# Patient Record
Sex: FEMALE
[De-identification: names, ages, dates, MRNs, and addresses within clinical notes are randomized per-mention and may not be internally consistent; named-entity substitution may affect disease eponyms.]

---

## 2022-07-15 PROBLEM — Z00.00 ENCOUNTER FOR PREVENTIVE HEALTH EXAMINATION: Status: ACTIVE | Noted: 2022-07-15

## 2022-07-29 ENCOUNTER — APPOINTMENT (OUTPATIENT)
Dept: UROLOGY | Facility: CLINIC | Age: 52
End: 2022-07-29

## 2022-07-29 VITALS
WEIGHT: 135 LBS | RESPIRATION RATE: 17 BRPM | DIASTOLIC BLOOD PRESSURE: 82 MMHG | TEMPERATURE: 97.6 F | HEIGHT: 62 IN | HEART RATE: 69 BPM | BODY MASS INDEX: 24.84 KG/M2 | SYSTOLIC BLOOD PRESSURE: 137 MMHG

## 2022-07-29 PROCEDURE — 99203 OFFICE O/P NEW LOW 30 MIN: CPT

## 2022-07-29 NOTE — ASSESSMENT
[FreeTextEntry1] : 51F with left UPJ obstruction\par \par - CT urogram to delineate anatomy\par - Discussed lap pyeloplasty\par - Patient to consider options and call back for surgical scheduling\par - Urine culture today

## 2022-07-29 NOTE — HISTORY OF PRESENT ILLNESS
[None] : no symptoms [FreeTextEntry1] : 51F with history of Left UPJ obstruction\par Dr Schumer (urologist) referred for pyeloplasty.\par Initially presented to ED in 2/2020 with dysuria and bilateral flank pain, patient followed up with PMD who referred to Urologist.\par Unclear that she ever had a CT scan but sounds like hydro on US led to urology referral.\par NM renogrphay in 2020 -> 41% L relative function with delayed uptake and excretion\par US 2021 -> "prominent left hydronephrosis.\par NM renography 2021 -> 44% function\par US 2021 -> severe left hydro, unchanged, normal parenchymal thickness\par MN renography 2022 -> delayed uptake and excretion, 36% relative function.\par \par Feeling well currently, no real pain, nausea/emesis. \par No hematuria, multiple UTIs over the past 6 months\par No UTI symptoms currently\par \par PMH: onychomycosis\par PSH: c section, candace\par FH: DM \par SH: social drinker, nonsmoker\par Meds: terbinafine

## 2022-08-01 LAB — BACTERIA UR CULT: NORMAL

## 2022-09-20 ENCOUNTER — APPOINTMENT (OUTPATIENT)
Dept: CT IMAGING | Facility: CLINIC | Age: 52
End: 2022-09-20

## 2022-09-20 ENCOUNTER — OUTPATIENT (OUTPATIENT)
Dept: OUTPATIENT SERVICES | Facility: HOSPITAL | Age: 52
LOS: 1 days | End: 2022-09-20
Payer: COMMERCIAL

## 2022-09-20 DIAGNOSIS — N13.5 CROSSING VESSEL AND STRICTURE OF URETER WITHOUT HYDRONEPHROSIS: ICD-10-CM

## 2022-09-20 PROCEDURE — 74178 CT ABD&PLV WO CNTR FLWD CNTR: CPT

## 2022-09-20 PROCEDURE — 74178 CT ABD&PLV WO CNTR FLWD CNTR: CPT | Mod: 26

## 2022-10-25 ENCOUNTER — OUTPATIENT (OUTPATIENT)
Dept: OUTPATIENT SERVICES | Facility: HOSPITAL | Age: 52
LOS: 1 days | End: 2022-10-25

## 2022-10-25 VITALS
OXYGEN SATURATION: 98 % | TEMPERATURE: 97 F | HEART RATE: 65 BPM | WEIGHT: 134.92 LBS | RESPIRATION RATE: 16 BRPM | HEIGHT: 63 IN | DIASTOLIC BLOOD PRESSURE: 80 MMHG | SYSTOLIC BLOOD PRESSURE: 130 MMHG

## 2022-10-25 DIAGNOSIS — Z90.49 ACQUIRED ABSENCE OF OTHER SPECIFIED PARTS OF DIGESTIVE TRACT: Chronic | ICD-10-CM

## 2022-10-25 DIAGNOSIS — N13.5 CROSSING VESSEL AND STRICTURE OF URETER WITHOUT HYDRONEPHROSIS: ICD-10-CM

## 2022-10-25 DIAGNOSIS — Z98.891 HISTORY OF UTERINE SCAR FROM PREVIOUS SURGERY: Chronic | ICD-10-CM

## 2022-10-25 LAB
ANION GAP SERPL CALC-SCNC: 8 MMOL/L — SIGNIFICANT CHANGE UP (ref 7–14)
BLD GP AB SCN SERPL QL: NEGATIVE — SIGNIFICANT CHANGE UP
BUN SERPL-MCNC: 12 MG/DL — SIGNIFICANT CHANGE UP (ref 7–23)
CALCIUM SERPL-MCNC: 9.2 MG/DL — SIGNIFICANT CHANGE UP (ref 8.4–10.5)
CHLORIDE SERPL-SCNC: 107 MMOL/L — SIGNIFICANT CHANGE UP (ref 98–107)
CO2 SERPL-SCNC: 29 MMOL/L — SIGNIFICANT CHANGE UP (ref 22–31)
CREAT SERPL-MCNC: 0.78 MG/DL — SIGNIFICANT CHANGE UP (ref 0.5–1.3)
EGFR: 91 ML/MIN/1.73M2 — SIGNIFICANT CHANGE UP
GLUCOSE SERPL-MCNC: 79 MG/DL — SIGNIFICANT CHANGE UP (ref 70–99)
HCG UR QL: NEGATIVE — SIGNIFICANT CHANGE UP
HCT VFR BLD CALC: 37.7 % — SIGNIFICANT CHANGE UP (ref 34.5–45)
HGB BLD-MCNC: 11.7 G/DL — SIGNIFICANT CHANGE UP (ref 11.5–15.5)
MCHC RBC-ENTMCNC: 25.5 PG — LOW (ref 27–34)
MCHC RBC-ENTMCNC: 31 GM/DL — LOW (ref 32–36)
MCV RBC AUTO: 82.1 FL — SIGNIFICANT CHANGE UP (ref 80–100)
NRBC # BLD: 0 /100 WBCS — SIGNIFICANT CHANGE UP (ref 0–0)
NRBC # FLD: 0 K/UL — SIGNIFICANT CHANGE UP (ref 0–0)
PLATELET # BLD AUTO: 274 K/UL — SIGNIFICANT CHANGE UP (ref 150–400)
POTASSIUM SERPL-MCNC: 3.8 MMOL/L — SIGNIFICANT CHANGE UP (ref 3.5–5.3)
POTASSIUM SERPL-SCNC: 3.8 MMOL/L — SIGNIFICANT CHANGE UP (ref 3.5–5.3)
RBC # BLD: 4.59 M/UL — SIGNIFICANT CHANGE UP (ref 3.8–5.2)
RBC # FLD: 15.6 % — HIGH (ref 10.3–14.5)
RH IG SCN BLD-IMP: POSITIVE — SIGNIFICANT CHANGE UP
SODIUM SERPL-SCNC: 144 MMOL/L — SIGNIFICANT CHANGE UP (ref 135–145)
WBC # BLD: 6.14 K/UL — SIGNIFICANT CHANGE UP (ref 3.8–10.5)
WBC # FLD AUTO: 6.14 K/UL — SIGNIFICANT CHANGE UP (ref 3.8–10.5)

## 2022-10-25 NOTE — H&P PST ADULT - GENITOURINARY COMMENTS
Past hx ureteral stricture with worsening kidney function - pt now for surgery to correct - pt denies flank pain or hematuria at present not examined

## 2022-10-25 NOTE — H&P PST ADULT - HISTORY OF PRESENT ILLNESS
Pt is a 52 yr old female scheduled for Left Laparoscopic Pyeloplasty with Dr Garcia tentatively 11/9/22 - pt with noted ureteral blockage 3 yrs ago - f/u with Urologist now notes decreased kidney function - pt hx frequent UTI but denies recent symptoms or tx.   Pt given information for COVID PCR testing sites and told to make appointment 3-5 days preop

## 2022-10-26 LAB
CULTURE RESULTS: SIGNIFICANT CHANGE UP
SPECIMEN SOURCE: SIGNIFICANT CHANGE UP

## 2022-11-05 LAB — SARS-COV-2 RNA SPEC QL NAA+PROBE: SIGNIFICANT CHANGE UP

## 2022-11-08 ENCOUNTER — TRANSCRIPTION ENCOUNTER (OUTPATIENT)
Age: 52
End: 2022-11-08

## 2022-11-08 NOTE — ASU PATIENT PROFILE, ADULT - FALL HARM RISK - UNIVERSAL INTERVENTIONS
Bed in lowest position, wheels locked, appropriate side rails in place/Call bell, personal items and telephone in reach/Instruct patient to call for assistance before getting out of bed or chair/Non-slip footwear when patient is out of bed/[place] to call system/Physically safe environment - no spills, clutter or unnecessary equipment/Purposeful Proactive Rounding/Room/bathroom lighting operational, light cord in reach

## 2022-11-09 ENCOUNTER — INPATIENT (INPATIENT)
Facility: HOSPITAL | Age: 52
LOS: 0 days | Discharge: HOME | End: 2022-11-10
Attending: UROLOGY | Admitting: UROLOGY

## 2022-11-09 ENCOUNTER — APPOINTMENT (OUTPATIENT)
Dept: UROLOGY | Facility: HOSPITAL | Age: 52
End: 2022-11-09

## 2022-11-09 VITALS
RESPIRATION RATE: 16 BRPM | WEIGHT: 134.92 LBS | DIASTOLIC BLOOD PRESSURE: 64 MMHG | HEIGHT: 63 IN | HEART RATE: 56 BPM | OXYGEN SATURATION: 100 % | SYSTOLIC BLOOD PRESSURE: 139 MMHG | TEMPERATURE: 98 F

## 2022-11-09 DIAGNOSIS — Z98.891 HISTORY OF UTERINE SCAR FROM PREVIOUS SURGERY: Chronic | ICD-10-CM

## 2022-11-09 DIAGNOSIS — N13.5 CROSSING VESSEL AND STRICTURE OF URETER WITHOUT HYDRONEPHROSIS: ICD-10-CM

## 2022-11-09 DIAGNOSIS — Z90.49 ACQUIRED ABSENCE OF OTHER SPECIFIED PARTS OF DIGESTIVE TRACT: Chronic | ICD-10-CM

## 2022-11-09 PROBLEM — Z87.448 PERSONAL HISTORY OF OTHER DISEASES OF URINARY SYSTEM: Chronic | Status: ACTIVE | Noted: 2022-10-25

## 2022-11-09 LAB — HCG UR QL: NEGATIVE — SIGNIFICANT CHANGE UP

## 2022-11-09 DEVICE — LIGATING CLIPS WECK HEMOLOK POLYMER LARGE (PURPLE) 6: Type: IMPLANTABLE DEVICE | Status: FUNCTIONAL

## 2022-11-09 DEVICE — URETERAL CATH AXXCESS OPEN END 6FR 70CM: Type: IMPLANTABLE DEVICE | Status: FUNCTIONAL

## 2022-11-09 DEVICE — STENT URET 7FRX28: Type: IMPLANTABLE DEVICE | Status: FUNCTIONAL

## 2022-11-09 DEVICE — CLIP APPLIER COVIDIEN ENDOCLIP III 5MM: Type: IMPLANTABLE DEVICE | Status: FUNCTIONAL

## 2022-11-09 DEVICE — GUIDEWIRE SENSOR DUAL-FLEX NITINOL STRAIGHT .035" X 150CM: Type: IMPLANTABLE DEVICE | Status: FUNCTIONAL

## 2022-11-09 RX ORDER — ONDANSETRON 8 MG/1
4 TABLET, FILM COATED ORAL EVERY 6 HOURS
Refills: 0 | Status: DISCONTINUED | OUTPATIENT
Start: 2022-11-09 | End: 2022-11-10

## 2022-11-09 RX ORDER — KETOROLAC TROMETHAMINE 30 MG/ML
15 SYRINGE (ML) INJECTION ONCE
Refills: 0 | Status: DISCONTINUED | OUTPATIENT
Start: 2022-11-09 | End: 2022-11-09

## 2022-11-09 RX ORDER — OXYCODONE HYDROCHLORIDE 5 MG/1
5 TABLET ORAL EVERY 4 HOURS
Refills: 0 | Status: DISCONTINUED | OUTPATIENT
Start: 2022-11-09 | End: 2022-11-10

## 2022-11-09 RX ORDER — ONDANSETRON 8 MG/1
4 TABLET, FILM COATED ORAL ONCE
Refills: 0 | Status: DISCONTINUED | OUTPATIENT
Start: 2022-11-09 | End: 2022-11-09

## 2022-11-09 RX ORDER — ACETAMINOPHEN 500 MG
975 TABLET ORAL EVERY 6 HOURS
Refills: 0 | Status: DISCONTINUED | OUTPATIENT
Start: 2022-11-09 | End: 2022-11-10

## 2022-11-09 RX ORDER — METOCLOPRAMIDE HCL 10 MG
10 TABLET ORAL ONCE
Refills: 0 | Status: COMPLETED | OUTPATIENT
Start: 2022-11-09 | End: 2022-11-10

## 2022-11-09 RX ORDER — SODIUM CHLORIDE 9 MG/ML
1000 INJECTION, SOLUTION INTRAVENOUS
Refills: 0 | Status: DISCONTINUED | OUTPATIENT
Start: 2022-11-09 | End: 2022-11-10

## 2022-11-09 RX ORDER — HYDROMORPHONE HYDROCHLORIDE 2 MG/ML
0.5 INJECTION INTRAMUSCULAR; INTRAVENOUS; SUBCUTANEOUS
Refills: 0 | Status: DISCONTINUED | OUTPATIENT
Start: 2022-11-09 | End: 2022-11-09

## 2022-11-09 RX ORDER — OXYCODONE HYDROCHLORIDE 5 MG/1
10 TABLET ORAL EVERY 4 HOURS
Refills: 0 | Status: DISCONTINUED | OUTPATIENT
Start: 2022-11-09 | End: 2022-11-10

## 2022-11-09 RX ORDER — HEPARIN SODIUM 5000 [USP'U]/ML
5000 INJECTION INTRAVENOUS; SUBCUTANEOUS EVERY 8 HOURS
Refills: 0 | Status: DISCONTINUED | OUTPATIENT
Start: 2022-11-09 | End: 2022-11-10

## 2022-11-09 RX ADMIN — OXYCODONE HYDROCHLORIDE 5 MILLIGRAM(S): 5 TABLET ORAL at 22:05

## 2022-11-09 RX ADMIN — SODIUM CHLORIDE 125 MILLILITER(S): 9 INJECTION, SOLUTION INTRAVENOUS at 21:57

## 2022-11-09 RX ADMIN — HYDROMORPHONE HYDROCHLORIDE 0.5 MILLIGRAM(S): 2 INJECTION INTRAMUSCULAR; INTRAVENOUS; SUBCUTANEOUS at 18:35

## 2022-11-09 RX ADMIN — HYDROMORPHONE HYDROCHLORIDE 0.5 MILLIGRAM(S): 2 INJECTION INTRAMUSCULAR; INTRAVENOUS; SUBCUTANEOUS at 18:05

## 2022-11-09 RX ADMIN — ONDANSETRON 4 MILLIGRAM(S): 8 TABLET, FILM COATED ORAL at 21:57

## 2022-11-09 RX ADMIN — HYDROMORPHONE HYDROCHLORIDE 0.5 MILLIGRAM(S): 2 INJECTION INTRAMUSCULAR; INTRAVENOUS; SUBCUTANEOUS at 17:35

## 2022-11-09 RX ADMIN — HEPARIN SODIUM 5000 UNIT(S): 5000 INJECTION INTRAVENOUS; SUBCUTANEOUS at 21:09

## 2022-11-09 RX ADMIN — Medication 15 MILLIGRAM(S): at 23:06

## 2022-11-09 RX ADMIN — OXYCODONE HYDROCHLORIDE 10 MILLIGRAM(S): 5 TABLET ORAL at 21:56

## 2022-11-09 RX ADMIN — OXYCODONE HYDROCHLORIDE 5 MILLIGRAM(S): 5 TABLET ORAL at 23:00

## 2022-11-09 RX ADMIN — Medication 975 MILLIGRAM(S): at 18:00

## 2022-11-09 RX ADMIN — Medication 975 MILLIGRAM(S): at 18:30

## 2022-11-09 RX ADMIN — Medication 15 MILLIGRAM(S): at 23:40

## 2022-11-09 NOTE — ASU PREOP CHECKLIST - NS PREOP CHK INSULIN PUMP THERAPY
Pt requesting Hydrocodone  mg      LOV 02/07/2019  Appt 05/09/2019  Last refilled 03/13/2019 # 90 with 0    Routed to MD for approval   n/a

## 2022-11-09 NOTE — CHART NOTE - NSCHARTNOTEFT_GEN_A_CORE
Post op Check    Pt seen and examined without complaints. Pain is controlled. Denies SOB/CP/N/V.     Vital Signs Last 24 Hrs  T(C): 36.7 (09 Nov 2022 20:30), Max: 37.1 (09 Nov 2022 19:00)  T(F): 98.1 (09 Nov 2022 20:30), Max: 98.8 (09 Nov 2022 19:00)  HR: 89 (09 Nov 2022 20:30) (56 - 96)  BP: 135/65 (09 Nov 2022 20:30) (105/74 - 214/190)  BP(mean): 81 (09 Nov 2022 19:00) (70 - 196)  RR: 18 (09 Nov 2022 20:30) (12 - 18)  SpO2: 98% (09 Nov 2022 20:30) (93% - 100%)    Parameters below as of 09 Nov 2022 20:30  Patient On (Oxygen Delivery Method): room air    I&O's Summary    09 Nov 2022 07:01  -  09 Nov 2022 20:46  --------------------------------------------------------  IN: 0 mL / OUT: 1025 mL / NET: -1025 mL    Physical Exam  Gen: NAD, A&Ox3  Pulm: No respiratory distress, no subcostal retractions  CV: RRR, no JVD  Abd: Soft, NT, ND  : salas in place, draining clear blood tinged urine    A/P: 52y Female s/p left laparoscopic pyeloplasty and stent placement   - Bactrim   - DVT prophylaxis/OOB  - Incentive spirometry  - Strict I&O's  - Analgesia as needed  - CLD  - AM labs  - DERRICK creatinine  - TOV in AM

## 2022-11-09 NOTE — BRIEF OPERATIVE NOTE - NSICDXBRIEFPROCEDURE_GEN_ALL_CORE_FT
PROCEDURES:  Laparoscopic left pyeloplasty 09-Nov-2022 16:12:24 and flexible cystoscopy with stent placement Mayo Ferrara

## 2022-11-10 ENCOUNTER — TRANSCRIPTION ENCOUNTER (OUTPATIENT)
Age: 52
End: 2022-11-10

## 2022-11-10 VITALS
HEART RATE: 88 BPM | SYSTOLIC BLOOD PRESSURE: 135 MMHG | RESPIRATION RATE: 18 BRPM | DIASTOLIC BLOOD PRESSURE: 78 MMHG | OXYGEN SATURATION: 99 % | TEMPERATURE: 98 F

## 2022-11-10 LAB
ANION GAP SERPL CALC-SCNC: 11 MMOL/L — SIGNIFICANT CHANGE UP (ref 7–14)
BUN SERPL-MCNC: 12 MG/DL — SIGNIFICANT CHANGE UP (ref 7–23)
CALCIUM SERPL-MCNC: 9.4 MG/DL — SIGNIFICANT CHANGE UP (ref 8.4–10.5)
CHLORIDE SERPL-SCNC: 100 MMOL/L — SIGNIFICANT CHANGE UP (ref 98–107)
CO2 SERPL-SCNC: 26 MMOL/L — SIGNIFICANT CHANGE UP (ref 22–31)
CREAT FLD-MCNC: 0.76 MG/DL — SIGNIFICANT CHANGE UP
CREAT SERPL-MCNC: 0.7 MG/DL — SIGNIFICANT CHANGE UP (ref 0.5–1.3)
EGFR: 103 ML/MIN/1.73M2 — SIGNIFICANT CHANGE UP
GLUCOSE SERPL-MCNC: 119 MG/DL — HIGH (ref 70–99)
HCT VFR BLD CALC: 33.6 % — LOW (ref 34.5–45)
HGB BLD-MCNC: 10.8 G/DL — LOW (ref 11.5–15.5)
MCHC RBC-ENTMCNC: 25.7 PG — LOW (ref 27–34)
MCHC RBC-ENTMCNC: 32.1 GM/DL — SIGNIFICANT CHANGE UP (ref 32–36)
MCV RBC AUTO: 80 FL — SIGNIFICANT CHANGE UP (ref 80–100)
NRBC # BLD: 0 /100 WBCS — SIGNIFICANT CHANGE UP (ref 0–0)
NRBC # FLD: 0 K/UL — SIGNIFICANT CHANGE UP (ref 0–0)
PLATELET # BLD AUTO: 305 K/UL — SIGNIFICANT CHANGE UP (ref 150–400)
POTASSIUM SERPL-MCNC: 4.1 MMOL/L — SIGNIFICANT CHANGE UP (ref 3.5–5.3)
POTASSIUM SERPL-SCNC: 4.1 MMOL/L — SIGNIFICANT CHANGE UP (ref 3.5–5.3)
RBC # BLD: 4.2 M/UL — SIGNIFICANT CHANGE UP (ref 3.8–5.2)
RBC # FLD: 15.1 % — HIGH (ref 10.3–14.5)
SODIUM SERPL-SCNC: 137 MMOL/L — SIGNIFICANT CHANGE UP (ref 135–145)
WBC # BLD: 13.61 K/UL — HIGH (ref 3.8–10.5)
WBC # FLD AUTO: 13.61 K/UL — HIGH (ref 3.8–10.5)

## 2022-11-10 PROCEDURE — 99222 1ST HOSP IP/OBS MODERATE 55: CPT

## 2022-11-10 RX ORDER — INFLUENZA VIRUS VACCINE 15; 15; 15; 15 UG/.5ML; UG/.5ML; UG/.5ML; UG/.5ML
0.5 SUSPENSION INTRAMUSCULAR ONCE
Refills: 0 | Status: DISCONTINUED | OUTPATIENT
Start: 2022-11-10 | End: 2022-11-10

## 2022-11-10 RX ORDER — SODIUM CHLORIDE 9 MG/ML
1000 INJECTION, SOLUTION INTRAVENOUS
Refills: 0 | Status: DISCONTINUED | OUTPATIENT
Start: 2022-11-10 | End: 2022-11-10

## 2022-11-10 RX ORDER — ACETAMINOPHEN 500 MG
1000 TABLET ORAL ONCE
Refills: 0 | Status: COMPLETED | OUTPATIENT
Start: 2022-11-10 | End: 2022-11-10

## 2022-11-10 RX ORDER — TAMSULOSIN HYDROCHLORIDE 0.4 MG/1
0.4 CAPSULE ORAL ONCE
Refills: 0 | Status: COMPLETED | OUTPATIENT
Start: 2022-11-10 | End: 2022-11-10

## 2022-11-10 RX ORDER — TAMSULOSIN HYDROCHLORIDE 0.4 MG/1
1 CAPSULE ORAL
Qty: 30 | Refills: 0
Start: 2022-11-10 | End: 2022-12-09

## 2022-11-10 RX ORDER — TRIMETHOPRIM HYDROCHLORIDE 50 MG/5ML
1 SOLUTION ORAL
Qty: 42 | Refills: 0
Start: 2022-11-10 | End: 2022-12-21

## 2022-11-10 RX ORDER — ACETAMINOPHEN 500 MG
2 TABLET ORAL
Qty: 0 | Refills: 0 | DISCHARGE

## 2022-11-10 RX ORDER — PHENAZOPYRIDINE HCL 100 MG
200 TABLET ORAL ONCE
Refills: 0 | Status: COMPLETED | OUTPATIENT
Start: 2022-11-10 | End: 2022-11-10

## 2022-11-10 RX ORDER — POLYETHYLENE GLYCOL 3350 17 G/17G
1 POWDER, FOR SOLUTION ORAL
Qty: 0 | Refills: 0 | DISCHARGE

## 2022-11-10 RX ORDER — OXYCODONE HYDROCHLORIDE 5 MG/1
1 TABLET ORAL
Qty: 8 | Refills: 0
Start: 2022-11-10

## 2022-11-10 RX ORDER — PHENAZOPYRIDINE HCL 100 MG
1 TABLET ORAL
Qty: 6 | Refills: 0
Start: 2022-11-10 | End: 2022-11-11

## 2022-11-10 RX ORDER — ACETAMINOPHEN 500 MG
1000 TABLET ORAL ONCE
Refills: 0 | Status: DISCONTINUED | OUTPATIENT
Start: 2022-11-10 | End: 2022-11-10

## 2022-11-10 RX ADMIN — Medication 200 MILLIGRAM(S): at 11:26

## 2022-11-10 RX ADMIN — Medication 1000 MILLIGRAM(S): at 00:11

## 2022-11-10 RX ADMIN — Medication 400 MILLIGRAM(S): at 12:47

## 2022-11-10 RX ADMIN — Medication 10 MILLIGRAM(S): at 00:06

## 2022-11-10 RX ADMIN — HEPARIN SODIUM 5000 UNIT(S): 5000 INJECTION INTRAVENOUS; SUBCUTANEOUS at 05:08

## 2022-11-10 RX ADMIN — Medication 1 TABLET(S): at 12:47

## 2022-11-10 RX ADMIN — Medication 10 MILLIGRAM(S): at 05:10

## 2022-11-10 RX ADMIN — SODIUM CHLORIDE 75 MILLILITER(S): 9 INJECTION, SOLUTION INTRAVENOUS at 07:54

## 2022-11-10 RX ADMIN — Medication 400 MILLIGRAM(S): at 06:01

## 2022-11-10 RX ADMIN — TAMSULOSIN HYDROCHLORIDE 0.4 MILLIGRAM(S): 0.4 CAPSULE ORAL at 11:26

## 2022-11-10 RX ADMIN — Medication 400 MILLIGRAM(S): at 00:35

## 2022-11-10 NOTE — DISCHARGE NOTE PROVIDER - CARE PROVIDER_API CALL
Franc Garcia; BRANDY)  Urology  [address] [place] [address]  Phone: [phone]  Fax: [phone]  Follow Up Time:

## 2022-11-10 NOTE — PROGRESS NOTE ADULT - ASSESSMENT
Patient is a 52 year old female now status post left lap pyeloplasty. Doing well , has been OOB , Orona was removed on rounds       Plan :   DERRICK Cr   DTV   Fu labs   Dc today with PO bactrim daily until stent removal in four weeks   Fu labs

## 2022-11-10 NOTE — DISCHARGE NOTE NURSING/CASE MANAGEMENT/SOCIAL WORK - NSDCPECAREGIVERED_GEN_ALL_CORE
Medline and carenotes for surgical procedure Stent placement, as well as DC Medications and side effects literature for patient reference.

## 2022-11-10 NOTE — CONSULT NOTE ADULT - ASSESSMENT
52F with PMH of ureteral obstruction  with worsening renal function. She was admitted s/p pyeloplasty and left ureteral stent.    #Hydronephrosis s/p pyeloplasty/left ureteral stent  -WBC up likely reactive from procedure. Hb slightly down from  blood loss.  -Afebrile, pain otherwise controlled.  -Abx as per urology.  -Dispo planning  -Further management as per .

## 2022-11-10 NOTE — PROGRESS NOTE ADULT - SUBJECTIVE AND OBJECTIVE BOX
The patient is a Patient is a 52y old  Female who presents with a chief complaint of     Vital Signs Last 24 Hrs  T(C): 36.8 (10 Nov 2022 09:20), Max: 37.1 (09 Nov 2022 19:00)  T(F): 98.2 (10 Nov 2022 09:20), Max: 98.8 (09 Nov 2022 19:00)  HR: 88 (10 Nov 2022 09:20) (75 - 96)  BP: 135/78 (10 Nov 2022 09:20) (105/74 - 214/190)  BP(mean): 81 (09 Nov 2022 19:00) (70 - 196)  RR: 18 (10 Nov 2022 09:20) (12 - 18)  SpO2: 99% (10 Nov 2022 09:20) (93% - 100%)    Parameters below as of 10 Nov 2022 09:20  Patient On (Oxygen Delivery Method): room air        ROS  Normal respiration  No chest pain    Physical exam  alert  nl respiratory effort  Abd -soft, appropriately tender , drain with small amount of serosang drainage.   Orona with hematuric urine     Urine:     I&O's Summary    09 Nov 2022 07:01  -  10 Nov 2022 07:00  --------------------------------------------------------  IN: 0 mL / OUT: 1732.5 mL / NET: -1732.5 mL    10 Nov 2022 07:01  -  10 Nov 2022 10:01  --------------------------------------------------------  IN: 0 mL / OUT: 400 mL / NET: -400 mL        LABS:            Urine Culture:       Radiology    Prior notes/chart reviewed.

## 2022-11-10 NOTE — DISCHARGE NOTE NURSING/CASE MANAGEMENT/SOCIAL WORK - PATIENT PORTAL LINK FT
You can access the FollowMyHealth Patient Portal offered by [facility] by registering at the following website: [email or web address]. By joining [facility]’s FollowMyHealth portal, you will also be able to view your health information using other applications (apps) compatible with our system.

## 2022-11-10 NOTE — CONSULT NOTE ADULT - SUBJECTIVE AND OBJECTIVE BOX
Julius Chanel MD ([facility])  Division of Hospital Medicine  Pager [phone]    HPI  52F with PMH of ureteral obstruction  with worsening renal function. She was admitted s/p pyeloplasty and left ureteral stent, Pt was seen at bedside. She denied any significant abdominal pain. However she report report burning sensation w/ and w/o urination around the vaginal region.  She states that she has been able to void, but in the past hr or so, she has been having the urge to void, but unable to. Otherwise, no f/c, NV, SOB, CP.     ROS:  All other systems negative except as noted above.     PAST MEDICAL & SURGICAL HISTORY:  H/O hydronephrosis      Ureteral stricture      H/O:       History of cholecystectomy        Allergies:   No Known Allergies      Meds:  MEDICATIONS  (STANDING):  acetaminophen   IVPB .. 1000 milliGRAM(s) IV Intermittent once  dextrose 5% + sodium chloride 0.45%. 1000 milliLiter(s) (75 mL/Hr) IV Continuous <Continuous>  heparin   Injectable 5000 Unit(s) SubCutaneous every 8 hours  influenza   Vaccine 0.5 milliLiter(s) IntraMuscular once  trimethoprim  160 mG/sulfamethoxazole 800 mG 1 Tablet(s) Oral daily    MEDICATIONS  (PRN):  ondansetron Injectable 4 milliGRAM(s) IV Push every 6 hours PRN Nausea and/or Vomiting  oxyCODONE    IR 5 milliGRAM(s) Oral every 4 hours PRN Moderate Pain (4 - 6)  oxyCODONE    IR 10 milliGRAM(s) Oral every 4 hours PRN Severe Pain (7 - 10)    Home Medications:  acetaminophen 325 mg oral tablet: 3 tablets every 6 hours as needed for mild to moderate pain (10 Nov 2022 11:58)  MiraLax oral powder for reconstitution: orally once a day as needed for constipation (10 Nov 2022 11:58)      SHx:  Denies any tobacco use. +Social ETOH use.  Employed.    FHx:  No known kidney dz in fam.    Physical Exam:  Vital Signs Last 24 Hrs  T(C): 36.8 (10 Nov 2022 09:20), Max: 37.1 (2022 19:00)  T(F): 98.2 (10 Nov 2022 09:20), Max: 98.8 (2022 19:00)  HR: 88 (10 Nov 2022 09:20) (75 - 96)  BP: 135/78 (10 Nov 2022 09:20) (105/74 - 214/190)  BP(mean): 81 (2022 19:00) (70 - 196)  RR: 18 (10 Nov 2022 09:20) (12 - 18)  SpO2: 99% (10 Nov 2022 09:20) (93% - 100%)    Parameters below as of 10 Nov 2022 09:20  Patient On (Oxygen Delivery Method): room air    Gen- In bed, comfortable, NAD  Eyes- EOMI, PERRLA, nonicteric.  EMNT- Fair dentition. MMM.   Neck- Supple.  Resp- CTAB, good effort. No r/r/w. No accessory muscle use.  CVS- RRR, S1S2, no g/r/m. No LE edema.  GI- Soft abd, appropriately tender, +BSx4.   MSK- No C/C. ROM intact. No crepitus.  Neuro- CN II-XII intact. Speech fluent/face symmetric. Sensation intact.  Psych- AAOx3. Appropriate mood/affect.      Labs:  CBC Full  -  ( 10 Nov 2022 09:48 )  WBC Count : 13.61 K/uL  RBC Count : 4.20 M/uL  Hemoglobin : 10.8 g/dL  Hematocrit : 33.6 %  Platelet Count - Automated : 305 K/uL  Mean Cell Volume : 80.0 fL  Mean Cell Hemoglobin : 25.7 pg  Mean Cell Hemoglobin Concentration : 32.1 gm/dL  Auto Neutrophil # : x  Auto Lymphocyte # : x  Auto Monocyte # : x  Auto Eosinophil # : x  Auto Basophil # : x  Auto Neutrophil % : x  Auto Lymphocyte % : x  Auto Monocyte % : x  Auto Eosinophil % : x  Auto Basophil % : x    11-10    137  |  100  |  12  ----------------------------<  119<H>  4.1   |  26  |  0.70    Ca    9.4      10 Nov 2022 09:48                Imaging:

## 2022-11-10 NOTE — DISCHARGE NOTE PROVIDER - HOSPITAL COURSE
51 yo F underwent uncomplicated left lap pyeloplasty/ureteral stent placement on 11/9/2022.  Postoperative course uneventful, successful TOV on POD #1 with low PVR,  pain controlled, ambulating. DERRICK Cr               Pt d/c on POD # to f/u with Dr. Garcia.  I-stop checked.   51 yo F underwent uncomplicated left lap pyeloplasty/ureteral stent placement on 11/9/2022.  Postoperative course uneventful, successful TOV on POD #1 with low PVR,  pain controlled, ambulating. DERRICK Cr = serum, DERRICK d/c.               Pt d/c on POD # to f/u with Dr. Garcia.  I-stop checked.   51 yo F underwent uncomplicated left lap pyeloplasty/ureteral stent placement on 11/9/2022.  Postoperative course uneventful, successful TOV on POD #1 with low PVR,  pain controlled, ambulating. DERRICK Cr = serum, DERRICK d/c.  Pt tolerating CLD, no N/V, no flatus yet.  Pt d/c on POD #1 to self advance diet and to f/u with Dr. Garcia.  I-stop checked.

## 2022-11-10 NOTE — DISCHARGE NOTE PROVIDER - NSDCCPCAREPLAN_GEN_ALL_CORE_FT
PRINCIPAL DISCHARGE DIAGNOSIS  Diagnosis: Ureteral stricture, left  Assessment and Plan of Treatment: Keep well hydrated.  No heavy lifting (greater than 10 pounds) or straining for 4 to 6 weeks.  You may shower, just pat white strips dry, they will fall off in a few weeks. Change dressing at drain site daily or as needed until dry. Do not drive when taking pain medication.  You may have intermittent blood tinged urine and slight flank pain when you urinate.  This is normal and due to the stent in your ureter.   If your urine becomes bright red or with clots, please call the office.  You have a ureteral stent to help with tissue healing and drainage of the kidney. Take flomax daily for stent discomfort. The stent is temporary, and must be eventually removed or it may cause problems with infection and kidney damage if left in place greater than 3 months.  Take antibiotic as prescribed.  Dr. Garcia's office will call you to schedule a follow up appointment for stent removal and further management.  Call the office if you have fever greater than 101, difficulty urinating, your urine becomes bloody, pain not relieved with pain medication, nausea/vomiting.

## 2022-11-10 NOTE — DISCHARGE NOTE PROVIDER - NSDCMRMEDTOKEN_GEN_ALL_CORE_FT
acetaminophen 325 mg oral tablet: 3 tablets every 6 hours as needed for mild to moderate pain  MiraLax oral powder for reconstitution: orally once a day as needed for constipation  oxyCODONE 5 mg oral tablet: 1 tablet every 6 hours as needed for severe pain MDD:4  phenazopyridine 200 mg oral tablet: 1 tab(s) orally 3 times a day (after meals) as needed for stent irritation, will make urine orange  tamsulosin 0.4 mg oral capsule: 1 cap(s) orally once a day as needed for stent irritation  trimethoprim 100 mg oral tablet: 1 tab(s) orally once a day

## 2022-11-10 NOTE — DISCHARGE NOTE NURSING/CASE MANAGEMENT/SOCIAL WORK - NSDPDISTO_GEN_ALL_CORE
Pt  with [place] to SGD, DSD to DERRICK insertion site, VS stable Afebrile. pt with positive bowel sounds nate po diet./Home

## 2022-11-10 NOTE — PATIENT PROFILE ADULT - FALL HARM RISK - HARM RISK INTERVENTIONS

## 2022-11-10 NOTE — DISCHARGE NOTE PROVIDER - CARE PROVIDERS DIRECT ADDRESSES
[email or web address]

## 2022-11-10 NOTE — DISCHARGE NOTE NURSING/CASE MANAGEMENT/SOCIAL WORK - NSDCPNINST_GEN_ALL_CORE
Schedule follow-up appointment(s) as instructed for stent removal in office. Notify physician for signs/symptoms of infection, including chills and/or fever greater than 101 deg F; nausea, vomiting, and/or diarrhea; increased pain and/or pain not relieved by medications; increased swelling, redness, and/or drainage at incision site(s). You may have intermittent blood tinged urine and slight flank pain when you urinate. This is normal and due to the stent in your ureter. If your urine becomes bright red or with clots, please call the office. No heavy lifting or straining for 2 to 4 weeks. Drink plenty of fluids and take over-the-counter stool softeners to prevent constipation, which can be a side effect of some pain medications. You may have some leakage from your back for the next few days, change bandage daily as needed.

## 2022-11-10 NOTE — DISCHARGE NOTE NURSING/CASE MANAGEMENT/SOCIAL WORK - NSDCPEFALRISK_GEN_ALL_CORE
For information on Fall & Injury Prevention, visit: [email or web address] OR  [email or web address] OR  https://www.cdc.gov/steadi/patient.html

## 2022-11-18 DIAGNOSIS — N13.30 UNSPECIFIED HYDRONEPHROSIS: ICD-10-CM

## 2022-12-09 ENCOUNTER — APPOINTMENT (OUTPATIENT)
Dept: UROLOGY | Facility: CLINIC | Age: 52
End: 2022-12-09

## 2022-12-09 ENCOUNTER — OUTPATIENT (OUTPATIENT)
Dept: OUTPATIENT SERVICES | Facility: HOSPITAL | Age: 52
LOS: 1 days | End: 2022-12-09
Payer: COMMERCIAL

## 2022-12-09 DIAGNOSIS — Z98.891 HISTORY OF UTERINE SCAR FROM PREVIOUS SURGERY: Chronic | ICD-10-CM

## 2022-12-09 DIAGNOSIS — N13.5 CROSSING VESSEL AND STRICTURE OF URETER W/OUT HYDRONEPHROSIS: ICD-10-CM

## 2022-12-09 DIAGNOSIS — R35.0 FREQUENCY OF MICTURITION: ICD-10-CM

## 2022-12-09 DIAGNOSIS — Z90.49 ACQUIRED ABSENCE OF OTHER SPECIFIED PARTS OF DIGESTIVE TRACT: Chronic | ICD-10-CM

## 2022-12-09 PROCEDURE — 52310 CYSTOSCOPY AND TREATMENT: CPT | Mod: 58

## 2022-12-09 PROCEDURE — 52310 CYSTOSCOPY AND TREATMENT: CPT

## 2022-12-12 DIAGNOSIS — N13.5 CROSSING VESSEL AND STRICTURE OF URETER WITHOUT HYDRONEPHROSIS: ICD-10-CM

## 2023-03-03 ENCOUNTER — APPOINTMENT (OUTPATIENT)
Dept: UROLOGY | Facility: CLINIC | Age: 53
End: 2023-03-03

## 2023-06-23 NOTE — ASU PATIENT PROFILE, ADULT - SPIRITUAL CULTURAL, CURRENT SITUATION, PROFILE
Previous Labs: Yes How Did The Hair Loss Occur?: gradual in onset How Severe Is Your Hair Loss?: moderate Lab Details: Normal no

## 2025-01-13 NOTE — BRIEF OPERATIVE NOTE - CONDITION POST OP
MESSAGE HAS BEEN READ TO PATIENT. PATIENT IS REQUESTING A CALL BACK WITH DATE AND TIME OF NEUROLOGY APPOINTMENT. PATIENT HAS NO OTHER QUESTIONS AT THIS TIME.    Stable

## (undated) DEVICE — ADAPTER ESCP CK FLO 9FR STERILE

## (undated) DEVICE — DRSG CURITY GAUZE SPONGE 4 X 4" 12-PLY

## (undated) DEVICE — LAP PAD 4 X 18"

## (undated) DEVICE — SUT ENDOSTITCH POLYSORB 2-0 48" ES-9

## (undated) DEVICE — PREP BETADINE SPONGE STICKS

## (undated) DEVICE — LIJ/LIA-HOLDER SCOPE: Type: DURABLE MEDICAL EQUIPMENT

## (undated) DEVICE — GOWN XL

## (undated) DEVICE — TUBING STRYKEFLOW II SUCTION / IRRIGATOR

## (undated) DEVICE — TROCAR SURGIQUEST AIRSEAL 5MM X 100MM

## (undated) DEVICE — CATH INSERTION TRAY W 10CC SYRINGE

## (undated) DEVICE — TROCAR SURGIQUEST AIRSEAL 8MMX100MM

## (undated) DEVICE — POSITIONER FOAM EGG CRATE ULNAR 2PCS (PINK)

## (undated) DEVICE — SOL IRR BAG NS 0.9% 3000ML

## (undated) DEVICE — FOLEY CATH 2-WAY 16FR 5CC LATEX LUBRICATH

## (undated) DEVICE — SUT ENDOSTITCH DEVICE 10MM

## (undated) DEVICE — SUT VICRYL 0 27" UR-6

## (undated) DEVICE — DRAIN JACKSON PRATT 7FR ROUND END NO TROCAR

## (undated) DEVICE — TAPE SILK 3"

## (undated) DEVICE — LIGASURE BLUNT TIP 37CM

## (undated) DEVICE — ELCTR GROUNDING PAD ADULT COVIDIEN

## (undated) DEVICE — TROCAR SURGIQUEST AIRSEAL 12MMX100MM

## (undated) DEVICE — SOL IRR BAG H2O 3000ML

## (undated) DEVICE — POSITIONER STRAP ARMBOARD VELCRO TS-30

## (undated) DEVICE — VENODYNE/SCD SLEEVE CALF MEDIUM

## (undated) DEVICE — DRAIN RESERVOIR FOR JACKSON PRATT 100CC CARDINAL

## (undated) DEVICE — SUT CAPROSYN 4-0 P-12 UNDYED

## (undated) DEVICE — D HELP - CLEARVIEW CLEARIFY SYSTEM

## (undated) DEVICE — BASIN SET SINGLE

## (undated) DEVICE — POSITIONER FOAM EGGCRATE PADS 20 X 72 X 2"

## (undated) DEVICE — TROCAR COVIDIEN VERSAONE BLADELESS FIXATION 5MM STANDARD

## (undated) DEVICE — DRAPE TOWEL BLUE 17" X 24"

## (undated) DEVICE — DRSG STERISTRIPS 0.5 X 4"

## (undated) DEVICE — DRSG BENZOIN 0.6CC

## (undated) DEVICE — CANISTER DISPOSABLE THIN WALL 3000CC

## (undated) DEVICE — BAG URINE W METER 2L

## (undated) DEVICE — SUT VICRYL 2-0 27" SH UNDYED

## (undated) DEVICE — SUT VICRYL 4-0 27" SH UNDYED

## (undated) DEVICE — INSUFFLATION NDL COVIDIEN SURGINEEDLE VERESS 120MM

## (undated) DEVICE — LUBRICATING JELLY ONESHOT 1.25OZ

## (undated) DEVICE — PACK GENERAL LAPAROSCOPY

## (undated) DEVICE — TUBING AIRSEAL TRI-LUMEN FILTERED

## (undated) DEVICE — TUBING IRR SET FOR CYSTOSCOPY 77"

## (undated) DEVICE — FOLEY CATH 2-WAY 16FR 5CC SILICONE

## (undated) DEVICE — SHEARS COVIDIEN ENDO SHEAR 5MM X 45CM LONG W MONOPOLAR CAUTERY

## (undated) DEVICE — LABELS BLANK W PEN

## (undated) DEVICE — SYR LUER LOK 10CC

## (undated) DEVICE — TUBING OLYMPUS INSUFFLATION

## (undated) DEVICE — PROTECTOR HEEL / ELBOW FLUFFY

## (undated) DEVICE — SUT VICRYL 1 36" CT-1 UNDYED